# Patient Record
Sex: MALE | Race: BLACK OR AFRICAN AMERICAN | NOT HISPANIC OR LATINO | Employment: FULL TIME | URBAN - METROPOLITAN AREA
[De-identification: names, ages, dates, MRNs, and addresses within clinical notes are randomized per-mention and may not be internally consistent; named-entity substitution may affect disease eponyms.]

---

## 2024-04-15 ENCOUNTER — OFFICE VISIT (OUTPATIENT)
Dept: URGENT CARE | Facility: URGENT CARE | Age: 49
End: 2024-04-15
Payer: COMMERCIAL

## 2024-04-15 VITALS
SYSTOLIC BLOOD PRESSURE: 126 MMHG | OXYGEN SATURATION: 98 % | TEMPERATURE: 98.3 F | WEIGHT: 183.3 LBS | RESPIRATION RATE: 16 BRPM | HEART RATE: 101 BPM | DIASTOLIC BLOOD PRESSURE: 89 MMHG

## 2024-04-15 DIAGNOSIS — H10.33 ACUTE BACTERIAL CONJUNCTIVITIS OF BOTH EYES: Primary | ICD-10-CM

## 2024-04-15 PROCEDURE — 99203 OFFICE O/P NEW LOW 30 MIN: CPT | Performed by: FAMILY MEDICINE

## 2024-04-15 RX ORDER — CIPROFLOXACIN HYDROCHLORIDE 3.5 MG/ML
2 SOLUTION/ DROPS TOPICAL EVERY 4 HOURS
Qty: 10 ML | Refills: 0 | Status: SHIPPED | OUTPATIENT
Start: 2024-04-15

## 2024-04-15 ASSESSMENT — ENCOUNTER SYMPTOMS
PHOTOPHOBIA: 0
EYE ITCHING: 1
EYE PAIN: 1
EYE DISCHARGE: 1
EYE REDNESS: 1

## 2024-04-15 NOTE — PATIENT INSTRUCTIONS
- Use the cipro eye drops 2 drops daily every 4 hours for 7 days.    - Use warm wash clothes to clean drainage.    - Practice good hand hygiene while cleaning the eyes.    - Return to clinic or Urgent care if symptoms worsen.

## 2024-04-15 NOTE — PROGRESS NOTES
Patient presents with:  Urgent Care  Eye Problem Left Eye: Swelling, pain inside eye, itching, x3 days, no known injury      Clinical Decision Making:      ICD-10-CM    1. Acute bacterial conjunctivitis of both eyes  H10.33           Patient Instructions       - Use the cipro eye drops 2 drops daily every 4 hours for 7 days.    - Use warm wash clothes to clean drainage.    - Practice good hand hygiene while cleaning the eyes.    - Return to clinic or Urgent care if symptoms worsen.        HPI:  Kiera Rojas is a 49 year old male who presents today complaining of 4 days of bilateral pink eyes with swelling, itching, yellowish discharge.     History obtained from the patient.    Problem List:  There are no relevant problems documented for this patient.      History reviewed. No pertinent past medical history.    Social History     Tobacco Use    Smoking status: Never    Smokeless tobacco: Never   Substance Use Topics    Alcohol use: Never       Review of Systems   Eyes:  Positive for pain, discharge, redness and itching. Negative for photophobia and visual disturbance.       Vitals:    04/15/24 1126   BP: 126/89   BP Location: Left arm   Patient Position: Sitting   Cuff Size: Adult Regular   Pulse: 101   Resp: 16   Temp: 98.3  F (36.8  C)   TempSrc: Oral   SpO2: 98%   Weight: 83.1 kg (183 lb 4.8 oz)       Physical Exam  Eyes:      General: Lids are normal.         Right eye: Discharge present. No foreign body or hordeolum.         Left eye: Discharge present.No foreign body or hordeolum.         Results:  No results found for any visits on 04/15/24.      At the end of the encounter, I discussed results, diagnosis, medications. Discussed red flags for immediate return to clinic/ER, as well as indications for follow up if no improvement. Patient understood and agreed to plan. Patient was stable for discharge.

## 2024-04-15 NOTE — LETTER
April 15, 2024      Kiera Rojas  [address]  [place] [facility] [address]        To Whom It May Concern:    Kiera Rojas was seen in our clinic. He may return to work without restrictions on 4/17/2024.      Sincerely,        Gera Medeiros MD

## 2024-09-18 ENCOUNTER — OFFICE VISIT (OUTPATIENT)
Dept: URGENT CARE | Facility: URGENT CARE | Age: 49
End: 2024-09-18
Payer: COMMERCIAL

## 2024-09-18 VITALS
TEMPERATURE: 98.2 F | DIASTOLIC BLOOD PRESSURE: 77 MMHG | RESPIRATION RATE: 16 BRPM | SYSTOLIC BLOOD PRESSURE: 118 MMHG | WEIGHT: 189.5 LBS | HEART RATE: 91 BPM | OXYGEN SATURATION: 98 %

## 2024-09-18 DIAGNOSIS — J30.2 SEASONAL ALLERGIC RHINITIS, UNSPECIFIED TRIGGER: ICD-10-CM

## 2024-09-18 DIAGNOSIS — R09.89 RUNNY NOSE: Primary | ICD-10-CM

## 2024-09-18 PROCEDURE — 99213 OFFICE O/P EST LOW 20 MIN: CPT | Performed by: PHYSICIAN ASSISTANT

## 2024-09-18 RX ORDER — FLUTICASONE PROPIONATE 50 MCG
1 SPRAY, SUSPENSION (ML) NASAL DAILY
Qty: 15.8 ML | Refills: 0 | Status: SHIPPED | OUTPATIENT
Start: 2024-09-18

## 2024-09-18 ASSESSMENT — ENCOUNTER SYMPTOMS
SHORTNESS OF BREATH: 0
FEVER: 0
CHEST TIGHTNESS: 0
ALLERGIC/IMMUNOLOGIC NEGATIVE: 1
GASTROINTESTINAL NEGATIVE: 1
RHINORRHEA: 1
PALPITATIONS: 0
NEUROLOGICAL NEGATIVE: 1
ABDOMINAL PAIN: 0
SINUS PRESSURE: 0
COUGH: 0
DYSURIA: 0
SINUS PAIN: 0
SORE THROAT: 0
WHEEZING: 0
CHILLS: 0
HEMATURIA: 0
NAUSEA: 0
HEADACHES: 0
DIARRHEA: 0
MUSCULOSKELETAL NEGATIVE: 1
RESPIRATORY NEGATIVE: 1
CONSTITUTIONAL NEGATIVE: 1
MYALGIAS: 0
FREQUENCY: 0
VOMITING: 0

## 2024-09-18 ASSESSMENT — PAIN SCALES - GENERAL: PAINLEVEL: WORST PAIN (10)

## 2024-09-18 NOTE — PROGRESS NOTES
"Chief Complaint:     Chief Complaint   Patient presents with    Headache     Patient reports headache at this time.     Sinus Problem     Patient reports a lot of \"fluid\" in sinuses that he is blowing out. Patient denies cough. Patient states he had pain in chest last night; denies chest pain at this time. Patient denies shortness of breath. Denies difficulty breathing.        No results found for any visits on 09/18/24.    Medical Decision Making:    Vital signs reviewed by Joselo Rojo PA-C  /77 (BP Location: Left arm, Patient Position: Sitting, Cuff Size: Adult Large)   Pulse 91   Temp 98.2  F (36.8  C) (Tympanic)   Resp 16   Wt 86 kg (189 lb 8 oz)   SpO2 98%     Differential Diagnosis:  URI Adult/Peds:  Sinusitis, Viral pharyngitis, and Viral upper respiratory illness        ASSESSMENT    1. Runny nose    2. Seasonal allergic rhinitis, unspecified trigger        PLAN    Patient is in no acute distress.    Temp is 98.2 in clinic today, lung sounds were clear, and O2 sats at 98% on RA.  Patient is not having any chest pain, SOB, or palpitations.  Low suspicion for cardiac cause.    Rx for Flonase sent in.  Start taking your OTC allergy medication  Rest, Push fluids, vaporizer, elevation of head of bed.  Ibuprofen and or Tylenol for any fever or body aches.  If symptoms worsen, recheck immediately otherwise follow up with your PCP in 1 week if symptoms are not improving.  Worrisome symptoms discussed with instructions to go to the ED.  Patient verbalized understanding and agreed with this plan.    Labs:    No results found for any visits on 09/18/24.     Vital signs reviewed by Joselo Rojo PA-C  /77 (BP Location: Left arm, Patient Position: Sitting, Cuff Size: Adult Large)   Pulse 91   Temp 98.2  F (36.8  C) (Tympanic)   Resp 16   Wt 86 kg (189 lb 8 oz)   SpO2 98%     Current Meds      Current Outpatient Medications:     fluticasone (FLONASE) 50 MCG/ACT nasal spray, Spray 1 spray into " "both nostrils daily., Disp: 15.8 mL, Rfl: 0    ciprofloxacin (CILOXAN) 0.3 % ophthalmic solution, Place 2 drops into both eyes every 4 hours (Patient not taking: Reported on 9/18/2024), Disp: 10 mL, Rfl: 0      Respiratory History    no history of pneumonia or bronchitis      SUBJECTIVE    HPI: Kiera Rojas is an 49 year old male who presents with nasal congestion and drainage.  Symptoms began 1  day ago and has gradually improving.  There is no shortness of breath, wheezing, nausea, and vomiting.  Patient is eating and drinking well.      Patient reports that yesterday he developed nasal congestion followed by one episode of severe chest pain last evening. He put some \"liquid\" on his chest and it improved. He is not currently having any chest pain and he denies any dizziness, syncope, arm pain, or jaw pain. He reports a history of seasonal allergies. He denies cough and fever.     Patient denies any recent travel or exposure to known COVID positive tested individual.      ROS:     Review of Systems   Constitutional: Negative.  Negative for chills and fever.   HENT:  Positive for congestion and rhinorrhea. Negative for postnasal drip, sinus pressure, sinus pain and sore throat.    Respiratory: Negative.  Negative for cough, chest tightness, shortness of breath and wheezing.    Cardiovascular:  Positive for chest pain. Negative for palpitations.   Gastrointestinal: Negative.  Negative for abdominal pain, diarrhea, nausea and vomiting.   Genitourinary:  Negative for dysuria, frequency, hematuria and urgency.   Musculoskeletal: Negative.  Negative for myalgias.   Skin:  Negative for rash.   Allergic/Immunologic: Negative.  Negative for immunocompromised state.   Neurological: Negative.  Negative for headaches.         Family History   No family history on file.     Problem history  There is no problem list on file for this patient.       Allergies  No Known Allergies     Social History  Social History "     Socioeconomic History    Marital status:      Spouse name: Not on file    Number of children: Not on file    Years of education: Not on file    Highest education level: Not on file   Occupational History    Not on file   Tobacco Use    Smoking status: Never     Passive exposure: Never    Smokeless tobacco: Never   Vaping Use    Vaping status: Never Used   Substance and Sexual Activity    Alcohol use: Never    Drug use: Not on file    Sexual activity: Not on file   Other Topics Concern    Not on file   Social History Narrative    Not on file     Social Determinants of Health     Financial Resource Strain: Not on file   Food Insecurity: Not on file   Transportation Needs: Not on file   Physical Activity: Not on file   Stress: Not on file   Social Connections: Not on file   Interpersonal Safety: Not on file   Housing Stability: Not on file        OBJECTIVE     Vital signs reviewed by Joselo Rojo PA-C  /77 (BP Location: Left arm, Patient Position: Sitting, Cuff Size: Adult Large)   Pulse 91   Temp 98.2  F (36.8  C) (Tympanic)   Resp 16   Wt 86 kg (189 lb 8 oz)   SpO2 98%      Physical Exam  Vitals reviewed.   Constitutional:       General: He is not in acute distress.     Appearance: He is well-developed. He is not ill-appearing, toxic-appearing or diaphoretic.   HENT:      Head: Normocephalic and atraumatic.      Right Ear: Hearing, tympanic membrane, ear canal and external ear normal. No drainage, swelling or tenderness. Tympanic membrane is not perforated, erythematous, retracted or bulging.      Left Ear: Hearing, tympanic membrane, ear canal and external ear normal. No drainage, swelling or tenderness. Tympanic membrane is not perforated, erythematous, retracted or bulging.      Nose: No nasal tenderness, mucosal edema, congestion or rhinorrhea.      Right Turbinates: Not enlarged or swollen.      Left Turbinates: Not enlarged or swollen.      Right Sinus: No maxillary sinus tenderness or  frontal sinus tenderness.      Left Sinus: No maxillary sinus tenderness or frontal sinus tenderness.      Mouth/Throat:      Pharynx: No pharyngeal swelling, oropharyngeal exudate, posterior oropharyngeal erythema or uvula swelling.      Tonsils: No tonsillar exudate. 0 on the right. 0 on the left.   Eyes:      General: Lids are normal.         Right eye: No discharge.         Left eye: No discharge.      Conjunctiva/sclera:      Right eye: Right conjunctiva is injected. No exudate.     Left eye: Left conjunctiva is injected. No exudate.     Pupils: Pupils are equal, round, and reactive to light.   Cardiovascular:      Rate and Rhythm: Normal rate and regular rhythm.      Heart sounds: Normal heart sounds. No murmur heard.     No friction rub. No gallop.   Pulmonary:      Effort: Pulmonary effort is normal. No accessory muscle usage, respiratory distress or retractions.      Breath sounds: Normal breath sounds and air entry. No stridor, decreased air movement or transmitted upper airway sounds. No decreased breath sounds, wheezing, rhonchi or rales.   Chest:      Chest wall: No tenderness.   Abdominal:      General: Bowel sounds are normal. There is no distension.      Palpations: Abdomen is soft. Abdomen is not rigid. There is no mass.      Tenderness: There is no abdominal tenderness. There is no guarding or rebound.   Musculoskeletal:         General: Normal range of motion.      Cervical back: Normal range of motion and neck supple.   Lymphadenopathy:      Head:      Right side of head: No submental, submandibular, tonsillar, preauricular or posterior auricular adenopathy.      Left side of head: No submental, submandibular, tonsillar, preauricular or posterior auricular adenopathy.      Cervical:      Right cervical: No superficial or posterior cervical adenopathy.     Left cervical: No superficial or posterior cervical adenopathy.   Skin:     General: Skin is warm.      Capillary Refill: Capillary refill  takes less than 2 seconds.   Neurological:      Mental Status: He is alert and oriented to person, place, and time.      Cranial Nerves: No cranial nerve deficit.      Sensory: No sensory deficit.      Motor: No abnormal muscle tone.      Coordination: Coordination normal.      Deep Tendon Reflexes: Reflexes normal.   Psychiatric:         Behavior: Behavior normal. Behavior is cooperative.         Thought Content: Thought content normal.         Judgment: Judgment normal.           Joselo Rojo PA-C  9/18/2024, 1:30 PM

## 2025-05-08 ENCOUNTER — OFFICE VISIT (OUTPATIENT)
Dept: OPTOMETRY | Facility: CLINIC | Age: 50
End: 2025-05-08
Payer: COMMERCIAL

## 2025-05-08 DIAGNOSIS — H52.03 HYPEROPIA, BILATERAL: ICD-10-CM

## 2025-05-08 DIAGNOSIS — H25.813 COMBINED FORMS OF AGE-RELATED CATARACT OF BOTH EYES: ICD-10-CM

## 2025-05-08 DIAGNOSIS — H04.123 DRY EYES: ICD-10-CM

## 2025-05-08 DIAGNOSIS — H52.4 PRESBYOPIA: ICD-10-CM

## 2025-05-08 DIAGNOSIS — H16.203 KERATOCONJUNCTIVITIS OF BOTH EYES: ICD-10-CM

## 2025-05-08 DIAGNOSIS — Z01.00 EXAMINATION OF EYES AND VISION: Primary | ICD-10-CM

## 2025-05-08 DIAGNOSIS — H10.813 PINGUECULITIS OF BOTH EYES: ICD-10-CM

## 2025-05-08 RX ORDER — NEOMYCIN POLYMYXIN B SULFATES AND DEXAMETHASONE 3.5; 10000; 1 MG/ML; [USP'U]/ML; MG/ML
SUSPENSION/ DROPS OPHTHALMIC
Qty: 5 ML | Refills: 0 | Status: SHIPPED | OUTPATIENT
Start: 2025-05-08 | End: 2025-05-22

## 2025-05-08 ASSESSMENT — CONF VISUAL FIELD
OD_INFERIOR_NASAL_RESTRICTION: 0
OS_INFERIOR_NASAL_RESTRICTION: 0
METHOD: COUNTING FINGERS
OD_NORMAL: 1
OD_SUPERIOR_TEMPORAL_RESTRICTION: 0
OD_INFERIOR_TEMPORAL_RESTRICTION: 0
OS_SUPERIOR_NASAL_RESTRICTION: 0
OS_SUPERIOR_TEMPORAL_RESTRICTION: 0
OS_INFERIOR_TEMPORAL_RESTRICTION: 0
OS_NORMAL: 1
OD_SUPERIOR_NASAL_RESTRICTION: 0

## 2025-05-08 ASSESSMENT — VISUAL ACUITY
OS_SC+: -2
OD_SC+: -2
METHOD: SNELLEN - LINEAR
OD_SC: 20/50
OS_SC: 20/30

## 2025-05-08 ASSESSMENT — TONOMETRY
IOP_METHOD: ICARE
OD_IOP_MMHG: 14.8
OS_IOP_MMHG: 14.47

## 2025-05-08 ASSESSMENT — EXTERNAL EXAM - LEFT EYE: OS_EXAM: NORMAL

## 2025-05-08 ASSESSMENT — REFRACTION_MANIFEST
OD_SPHERE: +1.25
OS_ADD: +1.50
OD_ADD: +1.50
OS_SPHERE: +0.75

## 2025-05-08 ASSESSMENT — KERATOMETRY
OS_K1POWER_DIOPTERS: 46.00
OD_K1POWER_DIOPTERS: 46.75
OD_AXISANGLE2_DEGREES: 109
OD_K2POWER_DIOPTERS: 46.75
OS_AXISANGLE_DEGREES: 12
OS_AXISANGLE2_DEGREES: 11
OD_AXISANGLE_DEGREES: 112
OS_K2POWER_DIOPTERS: 46.25

## 2025-05-08 ASSESSMENT — CUP TO DISC RATIO
OD_RATIO: 0.5
OS_RATIO: 0.5

## 2025-05-08 ASSESSMENT — SLIT LAMP EXAM - LIDS
COMMENTS: NORMAL
COMMENTS: NORMAL

## 2025-05-08 ASSESSMENT — EXTERNAL EXAM - RIGHT EYE: OD_EXAM: NORMAL

## 2025-05-08 NOTE — LETTER
"5/8/2025      Kiera Rojas  [address]  [facility] [address]      Dear Colleague,    Thank you for referring your patient, Kiera oRjas, to the [facility]. Please see a copy of my visit note below.    Chief Complaint   Patient presents with     Annual Eye Exam         Last Eye Exam: 2022- [facility]-  Dilated Previously: Yes    What are you currently using to see?  nothing       Distance Vision Acuity: Noticed gradual change in both eyes    Near Vision Acuity: Noticed more trouble reading in dim light    Eye Comfort: Itchy, watering, painful both eyes x 3 days and getting worse. Vision is \"blurry\".   Do you use eye drops? : No  Occupation or Hobbies:        Medical, surgical and family histories reviewed and updated 5/8/2025.       OBJECTIVE: See Ophthalmology exam    ASSESSMENT:    ICD-10-CM    1. Examination of eyes and vision  Z01.00 EYE EXAM (SIMPLE-NONBILLABLE)      2. Keratoconjunctivitis of both eyes  H16.203 neomycin-polymixin-dexAMETHasone (MAXITROL) 0.1 % ophthalmic suspension     EYE EXAM (SIMPLE-NONBILLABLE)      3. Pingueculitis of both eyes  H10.813 neomycin-polymixin-dexAMETHasone (MAXITROL) 0.1 % ophthalmic suspension     EYE EXAM (SIMPLE-NONBILLABLE)      4. Presbyopia  H52.4 REFRACTION      5. Hyperopia, bilateral  H52.03 REFRACTION      6. Dry eyes  H04.123 dextran 70-hypromellose (TEARS NATURALE FREE PF) 0.1-0.3 % ophthalmic solution      7. Combined forms of age-related cataract of both eyes  H25.813           PLAN:     Patient Instructions   Maxitrol- 1 drop both eyes 4 x day for 7 days then 2 x day for 7 days.    Non preserved artificial tears- 1 drop both eyes 4 x day.    Return in 2 weeks for recheck or sooner if needed.    You have the start of mild cataracts.  You may notice some blurred vision or glare with night driving.  It is important that you wear good sunglasses to protect your eyes from the ultraviolet light from the sun.  I " recommend that you return in 1 year for an eye exam unless there are any sudden changes in your vision.       Eyeglass prescription given.    Recommend annual eye exams.    Antione Harris, OD                                         Again, thank you for allowing me to participate in the care of your patient.        Sincerely,        Antione Harris, OD    Electronically signed

## 2025-05-08 NOTE — PROGRESS NOTES
"Chief Complaint   Patient presents with    Annual Eye Exam         Last Eye Exam: 2022- [facility]-  Dilated Previously: Yes    What are you currently using to see?  nothing       Distance Vision Acuity: Noticed gradual change in both eyes    Near Vision Acuity: Noticed more trouble reading in dim light    Eye Comfort: Itchy, watering, painful both eyes x 3 days and getting worse. Vision is \"blurry\".   Do you use eye drops? : No  Occupation or Hobbies:        Medical, surgical and family histories reviewed and updated 5/8/2025.       OBJECTIVE: See Ophthalmology exam    ASSESSMENT:    ICD-10-CM    1. Examination of eyes and vision  Z01.00 EYE EXAM (SIMPLE-NONBILLABLE)      2. Keratoconjunctivitis of both eyes  H16.203 neomycin-polymixin-dexAMETHasone (MAXITROL) 0.1 % ophthalmic suspension     EYE EXAM (SIMPLE-NONBILLABLE)      3. Pingueculitis of both eyes  H10.813 neomycin-polymixin-dexAMETHasone (MAXITROL) 0.1 % ophthalmic suspension     EYE EXAM (SIMPLE-NONBILLABLE)      4. Presbyopia  H52.4 REFRACTION      5. Hyperopia, bilateral  H52.03 REFRACTION      6. Dry eyes  H04.123 dextran 70-hypromellose (TEARS NATURALE FREE PF) 0.1-0.3 % ophthalmic solution      7. Combined forms of age-related cataract of both eyes  H25.813           PLAN:     Patient Instructions   Maxitrol- 1 drop both eyes 4 x day for 7 days then 2 x day for 7 days.    Non preserved artificial tears- 1 drop both eyes 4 x day.    Return in 2 weeks for recheck or sooner if needed.    You have the start of mild cataracts.  You may notice some blurred vision or glare with night driving.  It is important that you wear good sunglasses to protect your eyes from the ultraviolet light from the sun.  I recommend that you return in 1 year for an eye exam unless there are any sudden changes in your vision.       Eyeglass prescription given.    Recommend annual eye exams.    Antione Harris, OD                                       "

## 2025-05-08 NOTE — PATIENT INSTRUCTIONS
Maxitrol- 1 drop both eyes 4 x day for 7 days then 2 x day for 7 days.    Non preserved artificial tears- 1 drop both eyes 4 x day.    Return in 2 weeks for recheck or sooner if needed.    You have the start of mild cataracts.  You may notice some blurred vision or glare with night driving.  It is important that you wear good sunglasses to protect your eyes from the ultraviolet light from the sun.  I recommend that you return in 1 year for an eye exam unless there are any sudden changes in your vision.       Eyeglass prescription given.    Recommend annual eye exams.    Antione Harris, KRISTA    The affects of the dilating drops last for 4- 6 hours.  You will be more sensitive to light and vision will be blurry up close.  Do not drive if you do not feel comfortable.  Mydriatic sunglasses were given if needed.    There is a combination of three treatments which can greatly improve symptoms of dry eyes.     Artificial tears  Heat (eyes closed)  Eyelid and eyelash cleansing (eyes closed)     Use one drop of artificial tears both eyes 4 x daily.  Once in the morning, lunch, dinner and bedtime. Continue to use the drops regardless if your eyes are comfortable or not.  Artificial tears work best as a preventative and not as well after your eyes are starting to bother you.  It may take 4- 6 weeks of using the drops before you notice improvement.  If after that time you are still having problems schedule an appointment for an evaluation and discussion of different treatments such as Restasis or Xiidra.  Dry eyes are a chronic condition and you may have more symptoms at certain times of the year.    Excess tearing can be due to the right tears not working properly or a blockage in the tear drainage system.  You can try using artificial tears 1 drop both eyes 4 x day.  If the excess tearing is bothersome after 4-6 weeks of treatment then we can send you for further testing.  This would entail a referral to our oculoplastic specialist  Dr. Ramo Lee at the [facility]-[phone].    Recommended brands are:    Systane Complete  Systane Ultra  Systane Balance  Refresh Advanced Optive  Refresh Relieva  Blink    Recommended brands for contact lens wearers are:    Systane contacts  Refresh contacts  Blink contacts    If you are using drops more than 4 x day or have sensitivities to preservatives I recommend non preserved artificial tears.  These come in 1 use vials.  They can be used every 1-2 hours.  Do not reuse the vials.    Recommended brands are:    Refresh Optive Daron-3  Systane- preservative free  Refresh-  preservative free  Blink- preservative free    Gels or ointment can be used at night.    Recommended brands are:    Systane Gel  Refresh Gel  Blink Gel  Genteal Gel    Systane night time (ointment)  Refresh Celluvisc  Refresh PM (ointment)    Optase dry eye spray.  Spray to eyelids 3-4 x daily.  This can be purchased on Amazon.      Visine, Clear Eyes or Murine (drops that get the red out) can irritate the eyes and cause a rebound effect where the eyes become more red and you end up using more drops.  Avoid drops containing tetrahydrozoline, naphazoline, phenylephrine, oxymetazoline.      OTC Lumify is a newer product that gives immediate redness relief without the rebound effect.  Use as needed to take the redness out.    Artificial tears may be used with other drops (such as allergy, glaucoma, antibiotics) around the same time.  Be sure to wait 5 minutes in between drops.    Heat to the eyelids can also improve your symptoms of dry eyes.  Bob heat masks can be purchased at Amazon to be used nightly for 10-15 minutes.  Other options are gel masks that can be put in the microwave and purchased at most pharmacies.      Tea Tree Oil eyelid cleansers recommended are Ocusoft Oust foam cleanser to cleanse eyelids/lashes at night and in the am. Other options are Blephadex or Cliradex eyelid wipes.  KEEP EYES CLOSED when using these  products.  These can be purchased on amazon.com   A good product for make up remover with tea tree oil is WeLoveEyes.  This can be found at www.[employer] or iHydro[employer].    Other good eyelid cleansers have hypochlorous which removes excess bacteria and is safe around the eyes. Products are Avenova, Ocusoft Hypochlor or Heyedrate. Spray solution onto cotton pad, close eyes and gently apply to eyelids and eyelashes using side to side motion.  You can also KEEP EYES CLOSED spray and rub into eyelashes.  You do not need to rinse it off. Use morning and evening. These products can be found on Amazon.  You can check with your local pharmacy and see if they can order if for you if they don't have it.    Other brands of eyelid cleansing wipes are:    Ocusoft wipes  Systane wipes    A great eye make up line is [email or web address]          Optometry Providers       Clinic Locations                                 Telephone Number   Dr. Oneida Fernández [place]    [facility] [phone]     [facility] Hours:                [place] [facility] Hours:       [facility] Hours:   [address]   [address]     [address] [place] [address]   [place] [address]    [place] [address]  Phone: [phone]                    Phone: [phone]     Phone: [phone]                      Monday 8:00-6:00                          Monday 8:00-6:00                          Monday 8:00-6:00              Tuesday 8:00-6:00                          Tuesday 8:00-6:00                          Tuesday 8:00-6:00              Wednesday 8:00-6:00                  Wednesday 8:00-6:00                   Wednesday 8:00-6:00      Thursday 8:00-6:00                        Thursday 8:00-6:00                          Thursday 8:00-6:00            Friday 8:00-5:00                              Friday 8:00-5:00                              Friday 8:00-5:00    [facility] Hours:   [address] [address]  [phone]    Monday 9:00-6:00  Tuesday 9:00-6:00  Wednesday 9:00-6:00  Thursday 9:00-6:00  Friday 9:00-5:00  As always, Thank you for trusting us with your health care needs!

## 2025-05-19 ENCOUNTER — APPOINTMENT (OUTPATIENT)
Dept: OPTOMETRY | Facility: CLINIC | Age: 50
End: 2025-05-19
Payer: COMMERCIAL

## 2025-05-19 PROCEDURE — 92341 FIT SPECTACLES BIFOCAL: CPT | Performed by: OPTOMETRIST

## 2025-05-22 ENCOUNTER — OFFICE VISIT (OUTPATIENT)
Dept: OPTOMETRY | Facility: CLINIC | Age: 50
End: 2025-05-22
Payer: COMMERCIAL

## 2025-05-22 DIAGNOSIS — H16.203 KERATOCONJUNCTIVITIS OF BOTH EYES: Primary | ICD-10-CM

## 2025-05-22 DIAGNOSIS — H10.813 PINGUECULITIS OF BOTH EYES: ICD-10-CM

## 2025-05-22 ASSESSMENT — SLIT LAMP EXAM - LIDS
COMMENTS: NORMAL
COMMENTS: NORMAL

## 2025-05-22 ASSESSMENT — TONOMETRY
IOP_METHOD: APPLANATION
OD_IOP_MMHG: 18
OS_IOP_MMHG: 18

## 2025-05-22 ASSESSMENT — VISUAL ACUITY
OS_SC: 20/30
OS_SC+: -1
OD_SC: 20/40
METHOD: SNELLEN - LINEAR

## 2025-05-22 ASSESSMENT — EXTERNAL EXAM - LEFT EYE: OS_EXAM: NORMAL

## 2025-05-22 ASSESSMENT — CUP TO DISC RATIO
OS_RATIO: 0.5
OD_RATIO: 0.5

## 2025-05-22 ASSESSMENT — EXTERNAL EXAM - RIGHT EYE: OD_EXAM: NORMAL

## 2025-05-22 NOTE — PROGRESS NOTES
Red Eye Recheck    Reason:   Chief Complaint   Patient presents with    Corneal Evaluation     Recheck keratoconjunctivitis and pingueculitis    Eyes feel: little better    Medications used: Artificial Tears -vials- once at lunch and once at dinner and maxitrol- once in am and once in pm    How often: 2 times per day each      Shruthi Johnson, Optometric Assistant, A.B.O.C.     OBJECTIVE:     See ophthalmology exam      ASSESSMENT:        ICD-10-CM    1. Keratoconjunctivitis of both eyes  H16.203       2. Pingueculitis of both eyes  H10.813       Improved both eyes     PLAN:       Patient Instructions   Continue Maxitrol- 1 drop both eyes in the am and 1 drop in the pm until the bottle is empty.    Continue Non preserved artificial tears- 1 drop both eyes at lunchtime and dinnertime until above bottle is empty then 1 drop both eyes 4 x day.    Return in 1 year for a complete eye exam or sooner if needed.    Atnione Harris, OD

## 2025-05-22 NOTE — PATIENT INSTRUCTIONS
Continue Maxitrol- 1 drop both eyes in the am and 1 drop in the pm until the bottle is empty.    Continue Non preserved artificial tears- 1 drop both eyes at lunchtime and dinnertime until above bottle is empty then 1 drop both eyes 4 x day.    Return in 1 year for a complete eye exam or sooner if needed.    Antione Harris OD      Optometry Providers       Clinic Locations                                 Telephone Number   Dr. Oneida Maloney    [facility]  [facility] [phone]     [place] [facility] Hours:                [place] [facility] Hours:       [place] [facility] Hours:   [address]   [address] [address] [facility]     [address] [place] [address]   [place] [address]    [place] [address]  Phone: [phone]                    Phone: [phone]     Phone: [phone]                      Monday 8:00-6:00                          Monday 8:00-6:00                          Monday 8:00-6:00              Tuesday 8:00-6:00                          Tuesday 8:00-6:00                          Tuesday 8:00-6:00              Wednesday 8:00-6:00                  Wednesday 8:00-6:00                   Wednesday 8:00-6:00      Thursday 8:00-6:00                        Thursday 8:00-6:00                         Thursday 8:00-6:00            Friday 8:00-5:00                              Friday 8:00-5:00                              Friday 8:00-5:00    [facility] Hours:   [address] [place] [place] [address]  [phone]    Monday 9:00-6:00  Tuesday 9:00-6:00  Wednesday 9:00-6:00  Thursday 9:00-6:00  Friday 9:00-5:00  As always, Thank you for trusting us with your health care needs!

## 2025-05-22 NOTE — LETTER
5/22/2025      Kiera Rojas  [address]  [place] [address]      Dear Colleague,    Thank you for referring your patient, Kiera Rojas, to the [facility]. Please see a copy of my visit note below.    Red Eye Recheck    Reason:   Chief Complaint   Patient presents with     Corneal Evaluation     Recheck keratoconjunctivitis and pingueculitis    Eyes feel: little better    Medications used: Artificial Tears -vials- once at lunch and once at dinner and maxitrol- once in am and once in pm    How often: 2 times per day each      Shruthi Johnson, Optometric Assistant, A.B.O.C.     OBJECTIVE:     See ophthalmology exam      ASSESSMENT:        ICD-10-CM    1. Keratoconjunctivitis of both eyes  H16.203       2. Pingueculitis of both eyes  H10.813       Improved both eyes     PLAN:       Patient Instructions   Continue Maxitrol- 1 drop both eyes in the am and 1 drop in the pm until the bottle is empty.    Continue Non preserved artificial tears- 1 drop both eyes at lunchtime and dinnertime until above bottle is empty then 1 drop both eyes 4 x day.    Return in 1 year for a complete eye exam or sooner if needed.    Antione Harris, KRISTA                 Again, thank you for allowing me to participate in the care of your patient.        Sincerely,        Antione Harris, OD    Electronically signed

## 2025-06-02 DIAGNOSIS — H10.813 PINGUECULITIS OF BOTH EYES: ICD-10-CM

## 2025-06-02 DIAGNOSIS — H16.203 KERATOCONJUNCTIVITIS OF BOTH EYES: ICD-10-CM

## 2025-06-02 RX ORDER — NEOMYCIN POLYMYXIN B SULFATES AND DEXAMETHASONE 3.5; 10000; 1 MG/ML; [USP'U]/ML; MG/ML
SUSPENSION/ DROPS OPHTHALMIC
Qty: 5 ML | Refills: 0 | Status: SHIPPED | OUTPATIENT
Start: 2025-06-02 | End: 2025-06-16

## 2025-06-02 NOTE — CONFIDENTIAL NOTE
Patient is requesting refill of Maxitrol- left message on voicemail he will need to schedule a recheck in 1-2 weeks to check IOP and evaluate if flare ups.    Antione Harris, OD